# Patient Record
Sex: MALE | Race: WHITE | Employment: FULL TIME | URBAN - METROPOLITAN AREA
[De-identification: names, ages, dates, MRNs, and addresses within clinical notes are randomized per-mention and may not be internally consistent; named-entity substitution may affect disease eponyms.]

---

## 2019-08-26 ENCOUNTER — HOSPITAL ENCOUNTER (OUTPATIENT)
Dept: PHYSICAL THERAPY | Age: 59
Discharge: HOME | End: 2019-08-26
Payer: COMMERCIAL

## 2019-08-26 PROCEDURE — 97530 THERAPEUTIC ACTIVITIES: CPT

## 2019-08-26 PROCEDURE — 97140 MANUAL THERAPY 1/> REGIONS: CPT

## 2019-08-26 PROCEDURE — 97162 PT EVAL MOD COMPLEX 30 MIN: CPT

## 2019-08-26 NOTE — PROGRESS NOTES
[address] [place] [address], [address] [facility] [address] - Phone: [phone]  Fax: [phone] OF CARE / [address]  Patient Name: Deni Severino : 1960   Medical   Diagnosis: Lymphedema of left lower extremity [I89.0] Treatment Diagnosis: Lymphedema of left lower extremity [I89.0]   Onset Date:      Referral Source: Evy Simmons MD [facility]): 2019   Prior Hospitalization: See medical history Provider #: [id number]   Prior Level of Function: I with ADL's, no LE pain or swelling prior to surgery   Comorbidities: HBP, Hx of cancer   Medications: Verified on Patient Summary List   The Plan of Care and following information is based on the information from the initial evaluation.   ==================================================================================  Assessment / key information:  Deni Severino is a 59 y.o.  yo male with Dx: Lymphedema of left lower extremity [I89.0]. Pt reports having sarcoma removed in  and noting increased swelling in the L LE following cancer removal. Pt reports increase in L thigh and lower leg swelling starting in July. Pt reports recent medication changes possibly contributing to the change in edema. Pt reports currently having nodules on his lungs and chest wall, however no treatment planned at this time. Pt reports current symptoms include: swollen L LE and thigh. Pt denies pain at this time. Past medical history includes: HBP, Hx of cancer with surgery for removal. Functional limitations include: difficulty with standing, walking, sitting, recreational activities such as tennis, difficulty don/doff shoes. Previous treatment for lymphedema includes: wearing compression stockings, elevation of the LE's, previous OPPT 25 years ago. Pt reports no red flags or contraindications to treatment at this time.  FOTO score = 62 (an established functional score where 100 = no disability). Pt reports he sustained a fall last week while in the bathroom. Pt reports getting dizzy and falling. Pt denied seeking medical attention following the fall. Current Exam findings: Posture: WNL, Gait mild decrease in L knee flexion with ambulation, AROM: WNL (pt states that when swelling is at its worst difficulty with all knee and ankle motions), Gross Strength: 4+/5 L, 5/5 R, Skin Integrity: moderate dry, indurated tissue along the medial thigh with scar tissue, mild pitting of the thigh with hyperkeratosis, mild dry texture around the dorsum of the foot and ankle, Sensation: intact to light touch. Circumferential Measurements are listed below:   Right Left   MTP 23 23.5   Navicular 26 26   Ankle 23 25   Calf 38 42   Knee 42 47   Thigh  56 61       The patient was instructed in a home exercise program to address the above findings/deficits. Pt will benefit from PT interventions to address the aforementioned deficits and allow pt to return to PLOF. Pt's therapy may include: manual lymphatic drainage, compression wrapping, LE decongestive exercises, and patient education on skin care, signs and symptoms of infection, and self-management upon DC.      ==================================================================================  Eval Complexity: History MEDIUM  Complexity : 1-2 comorbidities / personal factors will impact the outcome/ POC ;  Examination  HIGH Complexity : 4+ Standardized tests and measures addressing body structure, function, activity limitation and / or participation in recreation ; Presentation MEDIUM Complexity : Evolving with changing characteristics ;   Decision Making MEDIUM Complexity : FOTO score of 26-74; Overall Complexity MEDIUM  Problem List: decrease ROM, decrease strength, edema affecting function, impaired gait/ balance, decrease ADL/ functional abilitiies, decrease activity tolerance, decrease flexibility/ joint mobility and decrease transfer abilities   Treatment Plan may include any combination of the following: Therapeutic exercise, Therapeutic activities, Neuromuscular re-education, Physical agent/modality, Gait/balance training, Manual therapy, Aquatic therapy, Patient education, Self Care training, Functional mobility training, Home safety training and Stair training  Patient / Family readiness to learn indicated by: asking questions, trying to perform skills and interest  Persons(s) to be included in education: patient (P)   Barriers to Learning/Limitations: None  Measures taken, if barriers to learning:    Patient Goal (s): Improve swelling   Patient self reported health status: good  Rehabilitation Potential: good  Goals:    Short Term Goals: To be accomplished in 6 treatments  1) Pt will be Independent with skin care routine to decrease risk of infection. 2) Pt will be Independent in don/doff/care of/wearing schedule of light compression. 3) Pt will verbalize with 100% accuracy which signs and symptoms to report immediately to physician concerning their condition. Long Term Goals: To be accomplished in 12 treatments  1) Pt will be Independent with compression management routine consisting of skin care, decongestive exercises, self-manual lymphatic stimulation techniques, strengthening and motion exercises, and don/doff/care of appropriate compression garment(s). 2) Patient will demonstrate decongestion of limb by 5% to improve functional ADL's  3) Pt will rate +5 on the GROC in order to demonstrate significant change in symptoms.      Frequency / Duration:   Patient to be seen  2  times per week for 8  treatments:  Patient / Caregiver education and instruction: exercises  G-Codes (GP): yordan  Therapist Signature: Alfonzo Roberts PT DPT   Date: 5/63/7826   Certification Period: YORDAN Time: 8:47 AM   ==================================================================================  I certify that the above Physical Therapy Services are being furnished while the patient is under my care. I agree with the treatment plan and certify that this therapy is necessary. Physician Signature:        Date:       Time:     Please sign and return to [facility], [facility]. or you may fax the signed copy to [phone]. Thank you.

## 2019-08-26 NOTE — PROGRESS NOTES
PHYSICAL THERAPY - DAILY TREATMENT NOTE    Patient Name: Chekih Pan        Date: 2019  : 1960   yes Patient  Verified  Visit #:   1     Insurance: Payor: /      In time: 900 Out time: 945   Total Treatment Time: 45     Medicare/[employer] Time Tracking (below)   Total Timed Codes (min):  25 1:1 Treatment Time:  25     TREATMENT AREA =  Lymphedema of left lower extremity [I89.0]    SUBJECTIVE  Pain Level (on 0 to 10 scale):  0  / 10   Medication Changes/New allergies or changes in medical history, any new surgeries or procedures?    no  If yes, update Summary List   Subjective Functional Status/Changes:  []  No changes reported     SEE POC         OBJECTIVE    15 min Evaluation- SEE POC     10 min Manual Therapy: L LE in supine   Rationale: decreased edema to improve the patients ability to perform functional ADL's    15 min Therapeutic Activity: Pt educated on:  s/s of lymphedema, signs of infection/red flags, importance of proper skin care, decongestive exercises. Pt given Lymphedema Packet. Rationale: To increased ROM, strength, decreased edema to improve the patients ability to perform functional ADL's    Billed With/As:   [] TE   [x] TA   [] Neuro   [] Self Care Patient Education: [x] Review HEP     [x] other: PATIENT GIVEN LYMPHEDEMA INFORMATION PACKET     Other Objective/Functional Measures:    SEE POC     Post Treatment Pain Level (on 0 to 10) scale:   0  / 10     ASSESSMENT  Assessment/Changes in Function:        Evaluation Code Complexity: History MEDIUM  Complexity : 1-2 comorbidities / personal factors will impact the outcome/ POC ; Examination HIGH Complexity : 4+ Standardized tests and measures addressing body structure, function, activity limitation and / or participation in recreation ; Presentation MEDIUM Complexity : Evolving with changing characteristics ; Decision Making MEDIUM Complexity : FOTO score of 26-74;  Complexity MEDIUM    Justification for Eval Code Complexity:  Patient History : Hx of chondrosarcoma, HBP  Examination SEE ABOVE EXAM  Clinical Presentation: evolving swelling  Clinical Decision Making : YJEB [id number]         []  See Progress Note/Recertification   Patient will continue to benefit from skilled PT services to modify and progress therapeutic interventions, address functional mobility deficits, address ROM deficits, address strength deficits, analyze and address soft tissue restrictions and address lymphedema to attain remaining goals. Progress toward goals / Updated goals:    SEE POC     PLAN  []  Upgrade activities as tolerated yes Continue plan of care   []  Discharge due to :    [x]  Other: Pt will be seen 2 times per week for 8-12 sessions.       Therapist: Thurl Dancer, PT, DPT    Date: 8/26/2019 Time: 8:48 AM     Future Appointments   Date Time Provider Lacey Toussaint   8/26/2019  9:00 AM You Yip, PT [facility]

## 2019-09-17 ENCOUNTER — HOSPITAL ENCOUNTER (OUTPATIENT)
Dept: PHYSICAL THERAPY | Age: 59
Discharge: HOME | End: 2019-09-17
Payer: COMMERCIAL

## 2019-09-17 PROCEDURE — 97140 MANUAL THERAPY 1/> REGIONS: CPT

## 2019-09-17 NOTE — PROGRESS NOTES
PHYSICAL THERAPY - DAILY TREATMENT NOTE    Patient Name: Rusty Vigil        Date: 2019  : 1960   yes Patient  Verified  Visit #:   2     Insurance: Payor: [employer] / Plan: [address] [place] [address] / Product Type: PPO /      In time: 315 Out time: 400   Total Treatment Time: 45     Medicare/[employer] Time Tracking (below)   Total Timed Codes (min):  45 1:1 Treatment Time:  45     TREATMENT AREA =  Lymphedema of left lower extremity [I89.0]    SUBJECTIVE  Pain Level (on 0 to 10 scale):  0  / 10   Medication Changes/New allergies or changes in medical history, any new surgeries or procedures?    no  If yes, update Summary List   Subjective Functional Status/Changes:  []  No changes reported     Pt reports not wearing compression or performing self MLD since his IE.          OBJECTIVE    45 min Manual Therapy: MLD: L LE in long sitting   Rationale:      decrease edema  to improve patient's ability to perform functional ADL's      Billed With/As:   [] TE   [] TA   [x] Manual   [] Self Care Patient Education: [x] Review HEP    [] Progressed/Changed HEP based on:   [] positioning   [] body mechanics   [] transfers   [] heat/ice application    [] other: Discussed with and educated patient on importance of decongestive exercises during manual therapy     Other Objective/Functional Measures:    Skin and Tissue Status: Moderate fibrotic indurated tissue of the L thigh and ankle    Measurements: ND    Compression Wrapping: ND           Post Treatment Pain Level (on 0 to 10) scale:   0  / 10     ASSESSMENT  Assessment/Changes in Function:     Tolerance to treatment: No adverse reaction to treatment    Patient Education: Educated on importance of compression and self MLD       []  See Progress Note/Recertification   Patient will continue to benefit from skilled PT services to modify and progress therapeutic interventions, address functional mobility deficits, address ROM deficits, address strength deficits, analyze and address soft tissue restrictions and address Lymphedema to attain remaining goals.    Progress toward goals / Updated goals:    Progress towards STG's: first visit  Progress towards LTG's: first visit     PLAN  []  Upgrade activities as tolerated yes Continue plan of care   []  Discharge due to :    []  Other:      Therapist: Krzysztof Piper PT, DPT    Date: 9/17/2019 Time: 7:43 AM     Future Appointments   Date Time Provider Lacey Toussaint   9/17/2019  3:15 PM Varun Yip [facility]   9/23/2019 10:00 AM Varun Yip [facility]   9/25/2019 10:00 AM Varun Yip [facility]   9/30/2019 10:00 AM Varun Yip [facility]   10/3/2019  9:30 AM Varun Yip [facility]   10/7/2019 10:00 AM Varun Yip [facility]   10/10/2019  9:30 AM Varun Yip [facility]   10/15/2019  9:30 AM Varun Yip [facility]   10/17/2019  9:30 AM Velma Yip PT [facility]

## 2019-09-23 ENCOUNTER — HOSPITAL ENCOUNTER (OUTPATIENT)
Dept: PHYSICAL THERAPY | Age: 59
Discharge: HOME | End: 2019-09-23
Payer: COMMERCIAL

## 2019-09-23 PROCEDURE — 97140 MANUAL THERAPY 1/> REGIONS: CPT

## 2019-09-23 NOTE — PROGRESS NOTES
PHYSICAL THERAPY - DAILY TREATMENT NOTE    Patient Name: Amanda Espinoza        Date: 2019  : 1960   yes Patient  Verified  Visit #:   3     Insurance: Payor: [facility] / Plan: [address] [place] [address] / Product Type: PPO /      In time: 1000 Out time: 1045   Total Treatment Time: 45     Medicare/[facility] Time Tracking (below)   Total Timed Codes (min):  45 1:1 Treatment Time:  45     TREATMENT AREA =  Lymphedema of left lower extremity [I89.0]    SUBJECTIVE  Pain Level (on 0 to 10 scale): 0  / 10   Medication Changes/New allergies or changes in medical history, any new surgeries or procedures?    no  If yes, update Summary List   Subjective Functional Status/Changes:  []  No changes reported     Pt reports playing tennis 2x/week and noting no increase in swelling. OBJECTIVE    45 min Manual Therapy: MLD: L LE in long sitting   Rationale:      decrease edema  to improve patient's ability to perform functional ADL's      Billed With/As:   [] TE   [] TA   [x] Manual   [] Self Care Patient Education: [x] Review HEP    [] Progressed/Changed HEP based on:   [] positioning   [] body mechanics   [] transfers   [] heat/ice application    [] other: Discussed with and educated patient on importance of decongestive exercises during manual therapy     Other Objective/Functional Measures:    Skin and Tissue Status: Moderate dry texture on the inner thigh. Measurements: ND    Compression Wrapping: ND           Post Treatment Pain Level (on 0 to 10) scale:   0  / 10     ASSESSMENT  Assessment/Changes in Function:     Tolerance to treatment: No adverse reaction to treatment    Patient Education: educated on pump and self massage. Pt declined any further pump discussion at this time.         []  See Progress Note/Recertification   Patient will continue to benefit from skilled PT services to modify and progress therapeutic interventions, address functional mobility deficits, address ROM deficits, address strength deficits, analyze and address soft tissue restrictions and address Lymphedema to attain remaining goals.    Progress toward goals / Updated goals:         PLAN  []  Upgrade activities as tolerated yes Continue plan of care   []  Discharge due to :    []  Other:      Therapist: Edna Winter, PT, DPT    Date: 9/23/2019 Time: 7:43 AM     Future Appointments   Date Time Provider Lacey Toussaint   9/23/2019 10:00 AM Houchins, Randal Kehr [facility]   9/25/2019 10:00 AM [facility]   9/30/2019 10:00 AM [facility]   10/3/2019  9:30 AM Theodora Randal Kehr [facility]   10/7/2019 10:00 [facility]   10/10/2019  9:30 [facility]   10/15/2019  9:30 [facility]   10/17/2019  9:30 AM Kelton Yip PT [facility]

## 2019-09-25 ENCOUNTER — HOSPITAL ENCOUNTER (OUTPATIENT)
Dept: PHYSICAL THERAPY | Age: 59
Discharge: HOME | End: 2019-09-25
Payer: COMMERCIAL

## 2019-09-25 PROCEDURE — 97140 MANUAL THERAPY 1/> REGIONS: CPT

## 2019-09-25 NOTE — PROGRESS NOTES
[facility] PHYSICAL THERAPY  [address] [place] [address] 201,[facility], [address] [facility] - Phone: [phone]  Fax: [phone]  PROGRESS NOTE  Patient Name: Dalton Mcgee : 1960   Treatment/Medical Diagnosis: Lymphedema of left lower extremity [I89.0]   Referral Source: Debbie Cruz MD     Date of Initial Visit: 19 Attended Visits: 4 Missed Visits: CX 0  NS 0     SUMMARY OF TREATMENT  Pt was seen on 19 for an initial evaluation for L LE Lymphedema. Pt has been seen for 4 visits and therapy has included: manual lymphatic drainage, and patient education on skin care, s/s infection, decongestive exercises, and HEP. CURRENT STATUS  Pt has been seen for 4 visits since IE on 19. Pt's therapy has included: manual lymphatic drainage, as well as patient education on self-management including: decongestive exercises, self-massage, positioning including elevation, appropriate wear of compression, as well as proper skin care and signs/symptoms of infection. Measurements are listed above. Pt reports max pain 0/10 at this time, with average pain 0/10. Pt reports good compliance with HEP at this time which includes decongestive exercises, self-massage, and positioning/elevation. Pt has been educated on self MLD as well as compression garments which may assist in the L thigh reduction. Pt would continue to benefit from skilled PT 2x/week for 6-8 sessions. Goals:    Short Term Goals: To be accomplished in 6 treatments  1) Pt will be Independent with skin care routine to decrease risk of infection.- goal met  2) Pt will be Independent in don/doff/care of/wearing schedule of light compression.- goal progressing  3) Pt will verbalize with 100% accuracy which signs and symptoms to report immediately to physician concerning their condition.- goal met        Long Term Goals:  To be accomplished in 12 treatments  1) Pt will be Independent with compression management routine consisting of skin care, decongestive exercises, self-manual lymphatic stimulation techniques, strengthening and motion exercises, and don/doff/care of appropriate compression garment(s). - goal progressing  2) Patient will demonstrate decongestion of limb by 5% to improve functional ADL's- goal progressing  3) Pt will rate +5 on the GROC in order to demonstrate significant change in symptoms. - will assess at DC        MEASUREMENTS:   Right 8/26/19 Left 8/26/19 Left 9/25/19   MTP 23 23.5 23   Navicular 26 26 25   Ankle 23 25 24   Calf 38 42 41   Knee 42 47 46   Thigh  56 61 60           New Goals to be achieved in __6-8__  treatments:  1. Continue with the above unmet goals      RECOMMENDATIONS  Continue skilled PT 2x/week for 6-8 session to progress patient to discharge and self management of symptoms through decongestive exercises, self massage, and compression wearing. If you have any questions/comments please contact us directly at [phone]. Thank you for allowing us to assist in the care of your patient. Therapist Signature: Krzysztof Piper PT, DPT Date: 9/25/2019   Reporting Period: NA Time: 7:16 AM   NOTE TO PHYSICIAN:  PLEASE COMPLETE THE ORDERS BELOW AND FAX TO   [facility]: [phone]  If you are unable to process this request in 24 hours please contact our office: [phone]    ___ I have read the above report and request that my patient continue as recommended.   ___ I have read the above report and request that my patient continue therapy with the following changes/special instructions:_________________________________________________________   ___ I have read the above report and request that my patient be discharged from therapy.      Physician Signature:        Date:       Time:

## 2019-09-25 NOTE — PROGRESS NOTES
PHYSICAL THERAPY - DAILY TREATMENT NOTE    Patient Name: Deni Severino        Date: 2019  : 1960   yes Patient  Verified  Visit #:   4     Insurance: Payor: Jamey Clark / Plan: [address] [place] [address] / Product Type: PPO /      In time: 1000 Out time: 1045   Total Treatment Time: 45     Medicare/[facility] Time Tracking (below)   Total Timed Codes (min):  45 1:1 Treatment Time:  45     TREATMENT AREA =  Lymphedema of left lower extremity [I89.0]    SUBJECTIVE  Pain Level (on 0 to 10 scale): 0  / 10   Medication Changes/New allergies or changes in medical history, any new surgeries or procedures?    no  If yes, update Summary List   Subjective Functional Status/Changes:  []  No changes reported     SEE PN         OBJECTIVE    45 min Manual Therapy: MLD: L LE in long sitting   Rationale:      decrease edema  to improve patient's ability to perform functional ADL's      Billed With/As:   [] TE   [] TA   [x] Manual   [] Self Care Patient Education: [x] Review HEP    [] Progressed/Changed HEP based on:   [] positioning   [] body mechanics   [] transfers   [] heat/ice application    [] other: Discussed with and educated patient on importance of decongestive exercises during manual therapy     Other Objective/Functional Measures:    SEE PN       Post Treatment Pain Level (on 0 to 10) scale:   0  / 10     ASSESSMENT  Assessment/Changes in Function:     SEE PN     []  See Progress Note/Recertification   Patient will continue to benefit from skilled PT services to modify and progress therapeutic interventions, address functional mobility deficits, address ROM deficits, address strength deficits, analyze and address soft tissue restrictions and address Lymphedema to attain remaining goals.    Progress toward goals / Updated goals:    SEE PN     PLAN  []  Upgrade activities as tolerated yes Continue plan of care   []  Discharge due to :    []  Other:      Therapist: Qing Patel, PT, DPT    Date: 9/25/2019 Time: 7:43 AM     Future Appointments   Date Time Provider Lacey Toussaint   9/25/2019 10:00 AM Tonia Yip [place] [id number]   9/30/2019 10:00 AM Tonia Yip [place] [address]   10/3/2019  9:30 AM Tonia Yip [place] [address]   10/7/2019 10:00 AM Tonia Yip [place] [address]   10/10/2019  9:30 AM Tonia Yip [place] [address]   10/15/2019  9:30 AM Tonia Yip [place] [address]   10/17/2019  9:30 AM Sahil Yip, PT [place] [address]

## 2019-09-30 ENCOUNTER — HOSPITAL ENCOUNTER (OUTPATIENT)
Dept: PHYSICAL THERAPY | Age: 59
Discharge: HOME | End: 2019-09-30
Payer: COMMERCIAL

## 2019-09-30 PROCEDURE — 97140 MANUAL THERAPY 1/> REGIONS: CPT

## 2019-09-30 NOTE — PROGRESS NOTES
PHYSICAL THERAPY - DAILY TREATMENT NOTE    Patient Name: Hayden Michael        Date: 2019  : 1960   yes Patient  Verified  Visit #:   5     Insurance: Payor: Darian Aguilar / Plan: [address] [place] [address] / Product Type: PPO /      In time: 1000 Out time: 1117   Total Treatment Time: 45     Medicare/[facility] Time Tracking (below)   Total Timed Codes (min):  45 1:1 Treatment Time:  45     TREATMENT AREA =  Lymphedema of left lower extremity [I89.0]    SUBJECTIVE  Pain Level (on 0 to 10 scale): 0  / 10   Medication Changes/New allergies or changes in medical history, any new surgeries or procedures?    no  If yes, update Summary List   Subjective Functional Status/Changes:  []  No changes reported     Pt reported moderare decreaes in swelling with new compression sock over the weekend. OBJECTIVE    45 min Manual Therapy: MLD: L LE in long sitting   Rationale:      decrease edema  to improve patient's ability to perform functional ADL's      Billed With/As:   [] TE   [] TA   [x] Manual   [] Self Care Patient Education: [x] Review HEP    [] Progressed/Changed HEP based on:   [] positioning   [] body mechanics   [] transfers   [] heat/ice application    [] other: Discussed with and educated patient on importance of decongestive exercises during manual therapy     Other Objective/Functional Measures:    No noted pitting along the anterior foot or ankle, increased fibrotic tissue along the medial thigh       Post Treatment Pain Level (on 0 to 10) scale:   0  / 10     ASSESSMENT  Assessment/Changes in Function:     Educated on thigh scar massage as well as assisted pt in ordering compression sock.       []  See Progress Note/Recertification   Patient will continue to benefit from skilled PT services to modify and progress therapeutic interventions, address functional mobility deficits, address ROM deficits, address strength deficits, analyze and address soft tissue restrictions and address Lymphedema to attain remaining goals.    Progress toward goals / Updated goals:    PN Last visit     PLAN  []  Upgrade activities as tolerated yes Continue plan of care   []  Discharge due to :    []  Other:      Therapist: Amaya Perdomo PT, DPT    Date: 9/30/2019 Time: 7:43 AM     Future Appointments   Date Time Provider Lacey Toussaint   9/30/2019 10:00 AM Nohemy Yip [facility]   10/3/2019  9:30 AM Nohemy Yip [facility]   10/7/2019 10:00 AM Nohemy Yip [facility]   10/10/2019  9:30 AM Nohemy Yip [facility]   10/15/2019  9:30 AM Nohemy Yip [facility]   10/17/2019  9:30 AM Mohini Yip PT [facility]

## 2019-10-03 ENCOUNTER — HOSPITAL ENCOUNTER (OUTPATIENT)
Dept: PHYSICAL THERAPY | Age: 59
Discharge: HOME | End: 2019-10-03
Payer: COMMERCIAL

## 2019-10-03 PROCEDURE — 97140 MANUAL THERAPY 1/> REGIONS: CPT

## 2019-10-03 NOTE — PROGRESS NOTES
PHYSICAL THERAPY - DAILY TREATMENT NOTE    Patient Name: Ina Homans        Date: 10/3/2019  : 1960   yes Patient  Verified  Visit #:   6     Insurance: Payor: Arnaldo Barnhart / Plan: [address] [place] [address] / Product Type: PPO /      In time: 930 Out time: 1010   Total Treatment Time: 40     Medicare/[employer] Time Tracking (below)   Total Timed Codes (min):  40 1:1 Treatment Time:  40     TREATMENT AREA =  Lymphedema of left lower extremity [I89.0]    SUBJECTIVE  Pain Level (on 0 to 10 scale): 0  / 10   Medication Changes/New allergies or changes in medical history, any new surgeries or procedures?    no  If yes, update Summary List   Subjective Functional Status/Changes:  []  No changes reported     Pt reports being able to see his foot bones for the first time in 4 weeks. OBJECTIVE    40 min Manual Therapy: MLD: L LE in long sitting   Rationale:      decrease edema  to improve patient's ability to perform functional ADL's      Billed With/As:   [] TE   [] TA   [x] Manual   [] Self Care Patient Education: [x] Review HEP    [] Progressed/Changed HEP based on:   [] positioning   [] body mechanics   [] transfers   [] heat/ice application    [] other: Discussed with and educated patient on importance of decongestive exercises during manual therapy     Other Objective/Functional Measures:    No noted swelling in the dorsum of the foot today compared to last visit. Post Treatment Pain Level (on 0 to 10) scale:   0  / 10     ASSESSMENT  Assessment/Changes in Function:     Continues to progress well.      []  See Progress Note/Recertification   Patient will continue to benefit from skilled PT services to modify and progress therapeutic interventions, address functional mobility deficits, address ROM deficits, address strength deficits, analyze and address soft tissue restrictions and address Lymphedema to attain remaining goals.    Progress toward goals / Updated goals:         PLAN  [] Upgrade activities as tolerated yes Continue plan of care   []  Discharge due to :    []  Other:      Therapist: Dontrell Brink, PT, DPT    Date: 10/3/2019 Time: 7:43 AM     Future Appointments   Date Time Provider Lacey Breana   10/3/2019  9:30 AM Flor Yip [facility]   10/7/2019 10:00 AM Flor Yip [facility]   10/10/2019  9:30 AM Flor Yip [facility]   10/15/2019  9:30 AM Flor Yip [facility]   10/17/2019  9:30 AM Flor Yip [facility]   11/4/2019 10:00 AM Flor Yip [facility]   11/6/2019 10:00 AM Cynthia Yip, KEYA [facility]

## 2019-10-07 ENCOUNTER — APPOINTMENT (OUTPATIENT)
Dept: PHYSICAL THERAPY | Age: 59
End: 2019-10-07
Payer: COMMERCIAL

## 2019-10-10 ENCOUNTER — HOSPITAL ENCOUNTER (OUTPATIENT)
Dept: PHYSICAL THERAPY | Age: 59
Discharge: HOME | End: 2019-10-10
Payer: COMMERCIAL

## 2019-10-10 PROCEDURE — 97140 MANUAL THERAPY 1/> REGIONS: CPT

## 2019-10-10 NOTE — PROGRESS NOTES
PHYSICAL THERAPY - DAILY TREATMENT NOTE    Patient Name: Pastor Calero        Date: 10/10/2019  : 1960   yes Patient  Verified  Visit #:     Insurance: Payor: Josefa Mcrae / Plan: [address] [place] [address] / Product Type: PPO /      In time: 930 Out time: 1010   Total Treatment Time: 40     Medicare/[facility] Time Tracking (below)   Total Timed Codes (min):  40 1:1 Treatment Time:  40     TREATMENT AREA =  Lymphedema of left lower extremity [I89.0]    SUBJECTIVE  Pain Level (on 0 to 10 scale): 0  / 10   Medication Changes/New allergies or changes in medical history, any new surgeries or procedures?    no  If yes, update Summary List   Subjective Functional Status/Changes:  []  No changes reported     Pt reports ordering 2 pairs of socks and wearing then daily. .         OBJECTIVE    40 min Manual Therapy: MLD: L LE in long sitting   Rationale:      decrease edema  to improve patient's ability to perform functional ADL's      Billed With/As:   [] TE   [] TA   [x] Manual   [] Self Care Patient Education: [x] Review HEP    [] Progressed/Changed HEP based on:   [] positioning   [] body mechanics   [] transfers   [] heat/ice application    [] other: Discussed with and educated patient on importance of decongestive exercises during manual therapy     Other Objective/Functional Measures: Moderate decrease in thigh edema and firbrotic tissue today following manual       Post Treatment Pain Level (on 0 to 10) scale:   0  / 10     ASSESSMENT  Assessment/Changes in Function:     Continue to wear stocking. Will be seen 2-3 more weeks then pt is going on a cruise. []  See Progress Note/Recertification   Patient will continue to benefit from skilled PT services to modify and progress therapeutic interventions, address functional mobility deficits, address ROM deficits, address strength deficits, analyze and address soft tissue restrictions and address Lymphedema to attain remaining goals.    Progress toward goals / Updated goals:         PLAN  []  Upgrade activities as tolerated yes Continue plan of care   []  Discharge due to :    []  Other:      Therapist: Hira Moody, PT, DPT    Date: 10/10/2019 Time: 7:43 AM     Future Appointments   Date Time Provider Lacey Toussaint   10/10/2019  9:30 AM [facility]   10/15/2019  9:30 AM [facility]ora [facility]   10/17/2019  9:30 AM Theodora [facility]   11/4/2019 10:00 AM Theodora [facility]   11/6/2019 10:00 AM Tia Yip, KEYA [facility]

## 2019-10-15 ENCOUNTER — HOSPITAL ENCOUNTER (OUTPATIENT)
Dept: PHYSICAL THERAPY | Age: 59
Discharge: HOME | End: 2019-10-15
Payer: COMMERCIAL

## 2019-10-15 PROCEDURE — 97140 MANUAL THERAPY 1/> REGIONS: CPT

## 2019-10-15 NOTE — PROGRESS NOTES
PHYSICAL THERAPY - DAILY TREATMENT NOTE    Patient Name: Sam [place]        Date: 10/15/2019  : 1960   yes Patient  Verified  Visit #:   8     Insurance: Payor: Mary Jo Vazquez / Plan: [address] [place] [address] / Product Type: PPO /      In time: 930 Out time: 4143   Total Treatment Time: 45     Medicare/[facility] Time Tracking (below)   Total Timed Codes (min):  45 1:1 Treatment Time:  45     TREATMENT AREA =  Lymphedema of left lower extremity [I89.0]    SUBJECTIVE  Pain Level (on 0 to 10 scale): 0  / 10   Medication Changes/New allergies or changes in medical history, any new surgeries or procedures?    no  If yes, update Summary List   Subjective Functional Status/Changes:  []  No changes reported     Pt reports playing tennis this weekend with his sleeve on and noting no increase in swelling         OBJECTIVE    45 min Manual Therapy: MLD: L LE in long sitting   Rationale:      decrease edema  to improve patient's ability to perform functional ADL's      Billed With/As:   [] TE   [] TA   [x] Manual   [] Self Care Patient Education: [x] Review HEP    [] Progressed/Changed HEP based on:   [] positioning   [] body mechanics   [] transfers   [] heat/ice application    [] other: Discussed with and educated patient on importance of decongestive exercises during manual therapy     Other Objective/Functional Measures:    Continues to have fullness in the L inner thigh with mild fibrotic scar tissue        Post Treatment Pain Level (on 0 to 10) scale:   0  / 10     ASSESSMENT  Assessment/Changes in Function:     Good tolerance to all MLD and good compliance with Home exercises     []  See Progress Note/Recertification   Patient will continue to benefit from skilled PT services to modify and progress therapeutic interventions, address functional mobility deficits, address ROM deficits, address strength deficits, analyze and address soft tissue restrictions and address Lymphedema to attain remaining goals. Progress toward goals / Updated goals:         PLAN  []  Upgrade activities as tolerated yes Continue plan of care   []  Discharge due to :    []  Other:      Therapist: Angelina Redman, PT, DPT    Date: 10/15/2019 Time: 7:43 AM     Future Appointments   Date Time Provider Lacey Toussaint   10/15/2019  9:30 AM Silvio Yip [facility]   10/17/2019  9:30 AM Silvio Yip [facility]   11/4/2019 10:00 AM Silvio Yip [facility]   11/6/2019 10:00 AM Jerome Yip PT [facility]

## 2019-10-17 ENCOUNTER — HOSPITAL ENCOUNTER (OUTPATIENT)
Dept: PHYSICAL THERAPY | Age: 59
Discharge: HOME | End: 2019-10-17
Payer: COMMERCIAL

## 2019-10-17 PROCEDURE — 97140 MANUAL THERAPY 1/> REGIONS: CPT

## 2019-10-17 NOTE — PROGRESS NOTES
PHYSICAL THERAPY - DAILY TREATMENT NOTE    Patient Name: Lionel Lopez        Date: 10/17/2019  : 1960   yes Patient  Verified  Visit #:     Insurance: Payor: Hailee Bronson / Plan: [address] [place] [address] / Product Type: PPO /      In time: 930 Out time: 1000   Total Treatment Time: 30     Medicare/[facility] Time Tracking (below)   Total Timed Codes (min):  30 1:1 Treatment Time: 30     TREATMENT AREA =  Lymphedema of left lower extremity [I89.0]    SUBJECTIVE  Pain Level (on 0 to 10 scale): 0  / 10   Medication Changes/New allergies or changes in medical history, any new surgeries or procedures?    no  If yes, update Summary List   Subjective Functional Status/Changes:  []  No changes reported     Pt reports wearing his sleeve to play tennis this week. OBJECTIVE    30 min Manual Therapy: MLD: L LE in long sitting   Rationale:      decrease edema  to improve patient's ability to perform functional ADL's      Billed With/As:   [] TE   [] TA   [x] Manual   [] Self Care Patient Education: [x] Review HEP    [] Progressed/Changed HEP based on:   [] positioning   [] body mechanics   [] transfers   [] heat/ice application    [] other: Discussed with and educated patient on importance of decongestive exercises during manual therapy     Other Objective/Functional Measures:    Mild fibrotic tissue in the inner L thigh       Post Treatment Pain Level (on 0 to 10) scale:   0  / 10     ASSESSMENT  Assessment/Changes in Function:     Will be out of town for 2 weeks. []  See Progress Note/Recertification   Patient will continue to benefit from skilled PT services to modify and progress therapeutic interventions, address functional mobility deficits, address ROM deficits, address strength deficits, analyze and address soft tissue restrictions and address Lymphedema to attain remaining goals.    Progress toward goals / Updated goals:    Pt will be OOT for 2 weeks then will return for 2 remaining visits following return from trip     PLAN  []  Upgrade activities as tolerated yes Continue plan of care   []  Discharge due to :    []  Other:      Therapist: Jaclyn Longoria, PT, DPT    Date: 10/17/2019 Time: 7:43 AM     Future Appointments   Date Time Provider Lacey Toussaint   10/17/2019  9:30 AM Mercy Yip [facility]   11/4/2019 10:00 AM Mercy Yip [facility]   11/6/2019 10:00 AM Jostin Yip, KEYA [facility]

## 2019-11-04 ENCOUNTER — HOSPITAL ENCOUNTER (OUTPATIENT)
Dept: PHYSICAL THERAPY | Age: 59
Discharge: HOME | End: 2019-11-04
Payer: COMMERCIAL

## 2019-11-04 PROCEDURE — 97140 MANUAL THERAPY 1/> REGIONS: CPT

## 2019-11-04 NOTE — PROGRESS NOTES
PHYSICAL THERAPY - DAILY TREATMENT NOTE    Patient Name: Leticia Mcghee        Date: 2019  : 1960   yes Patient  Verified  Visit #:  10     Insurance: Payor: Karen Harvey / Plan: [address] [place] [address] / Product Type: PPO /      In time: 1000 Out time: 1045   Total Treatment Time: 45     Medicare/[employer] Time Tracking (below)   Total Timed Codes (min):  45 1:1 Treatment Time: 45     TREATMENT AREA =  Lymphedema of left lower extremity [I89.0]    SUBJECTIVE  Pain Level (on 0 to 10 scale): 0  / 10   Medication Changes/New allergies or changes in medical history, any new surgeries or procedures?    no  If yes, update Summary List   Subjective Functional Status/Changes:  []  No changes reported     Pt reports returning from his cruise and stated he was only able to wear his stocking intermittently          OBJECTIVE    45 min Manual Therapy: MLD: L LIZBETH in long sitting   Rationale:      decrease edema  to improve patient's ability to perform functional ADL's      Billed With/As:   [] TE   [] TA   [x] Manual   [] Self Care Patient Education: [x] Review HEP    [] Progressed/Changed HEP based on:   [] positioning   [] body mechanics   [] transfers   [] heat/ice application    [] other: Discussed with and educated patient on importance of decongestive exercises during manual therapy     Other Objective/Functional Measures:    Mild increase in hardened tissue on the inner thigh as well as dorsum of the foot       Post Treatment Pain Level (on 0 to 10) scale:   0  / 10     ASSESSMENT  Assessment/Changes in Function:     Will be DC NV     []  See Progress Note/Recertification   Patient will continue to benefit from skilled PT services to modify and progress therapeutic interventions, address functional mobility deficits, address ROM deficits, address strength deficits, analyze and address soft tissue restrictions and address Lymphedema to attain remaining goals.    Progress toward goals / Updated goals:    DC NV     PLAN  []  Upgrade activities as tolerated yes Continue plan of care   []  Discharge due to :    []  Other:      Therapist: Cayetano Pastrana PT, DPT    Date: 11/4/2019 Time: 7:43 AM     Future Appointments   Date Time Provider Lacey Toussaint   11/4/2019 10:00 AM Varinder Yip, KEYA [facility]   11/6/2019 10:00 AM Varinder Yip, KEYA [facility]

## 2019-11-06 ENCOUNTER — HOSPITAL ENCOUNTER (OUTPATIENT)
Dept: PHYSICAL THERAPY | Age: 59
Discharge: HOME | End: 2019-11-06
Payer: COMMERCIAL

## 2019-11-06 PROCEDURE — 97140 MANUAL THERAPY 1/> REGIONS: CPT

## 2019-11-06 PROCEDURE — 97530 THERAPEUTIC ACTIVITIES: CPT

## 2019-11-06 NOTE — PROGRESS NOTES
[facility] PHYSICAL THERAPY  [address] [place] [address] 51, [facility] 201,[facility], 70 [facility] - Phone: [phone]  Fax: [phone] FOR PHYSICAL THERAPY          Patient Name: Gabriel Sanderson : 1960   Treatment/Medical Diagnosis: Lymphedema of left lower extremity [I89.0]   Onset Date:     Referral Source: Majo Teixeira MD [facility]): 19   Prior Hospitalization: See Medical History Provider #: [id number]   Prior Level of Function: I with ADL's, no LE pain or swelling prior to surgery   Comorbidities: HBP, Hx of cancer   Medications: Verified on Patient Summary List   Visits from [facility]: 11 Missed Visits: 0     Goals:  Goals:    Short Term Goals: To be accomplished in 6 treatments  1) Pt will be Independent with skin care routine to decrease risk of infection.- goal met  2) Pt will be Independent in don/doff/care of/wearing schedule of light compression.- goal met  3) Pt will verbalize with 100% accuracy which signs and symptoms to report immediately to physician concerning their condition.- goal met        Long Term Goals: To be accomplished in 12 treatments  1) Pt will be Independent with compression management routine consisting of skin care, decongestive exercises, self-manual lymphatic stimulation techniques, strengthening and motion exercises, and don/doff/care of appropriate compression garment(s). - goal met  2) Patient will demonstrate decongestion of limb by 5% to improve functional ADL's- goal progressed, see below  3) Pt will rate +5 on the GROC in order to demonstrate significant change in symptoms.- goal not met +4 reported        MEASUREMENTS:    Right 19 Left   19 Left 19 Left 19   MTP 23 23.5 23 23   Navicular 26 26 25 25   Ankle 23 25 24 24   Calf 38 42 41 40   Knee 42 47 46 45   Thigh  56 61 60 60          Key Functional Changes/Progress:  Pt has been seen for 11 visit since IE on 19.  Pt's therapy has included: manual lymphatic drainage,  as well as patient education on self-management including: decongestive exercises, self-massage, positioning including elevation, appropriate wear of compression, as well as proper skin care and signs/symptoms of infection. Measurements are listed above. Pt reports max pain 4/10 at this time, with average pain 1/10. Pt reports intermittent compliance with HEP at this time which includes decongestive exercises, self-massage, and positioning/elevation. Pt will be DC from therapy at this time with instructions on self management of symptoms. Pt is able to play tennis with use of compression sock as well as perform self massage daily. Assessments/Recommendations: Discontinue therapy. Progressing towards or have reached established goals. If you have any questions/comments please contact us directly at [phone]. Thank you for allowing us to assist in the care of your patient.     Therapist Signature: Hira Moody PT, DPT   Date: 11/6/2019   Reporting Period: NA Time: 9:16 AM

## 2019-11-06 NOTE — PROGRESS NOTES
PHYSICAL THERAPY - DAILY TREATMENT NOTE    Patient Name: Colonel Kay        Date: 2019  : 1960   yes Patient  Verified  Visit #:     Insurance: Payor: Magan Garzon / Plan: [address] [place] [address] / Product Type: PPO /      In time: 1000 Out time: 1045   Total Treatment Time: 45     Medicare/[facility] Time Tracking (below)   Total Timed Codes (min):  45 1:1 Treatment Time:  45     TREATMENT AREA =  Lymphedema of left lower extremity [I89.0]    SUBJECTIVE  Pain Level (on 0 to 10 scale):  0  / 10   Medication Changes/New allergies or changes in medical history, any new surgeries or procedures?    no  If yes, update Summary List   Subjective Functional Status/Changes:  []  No changes reported     SEE DC         OBJECTIVE    30 min Manual Therapy: MLD to the L LE in long sitting   Rationale:      decrease edema  to improve patient's ability to perform functional ADL's    15 min Therapeutic Activity: Maintanence Program instructions given to patient. Extensive patient education on continuation of decongestive exercises, self massage, compression, and elevation at home with handouts given and discussed. Rationale:    increase patient education in order to improve pt's ability to perform self management of symptoms upon DC from therapy.      Billed With/As:   [] TE   [x] TA   [x] Manual   [] Self Care Patient Education: [x] Review HEP    [] Progressed/Changed HEP based on:   [] positioning   [] body mechanics   [] transfers   [] heat/ice application    [] other: Discussed maintence program and patient given handout with DC instructions for self management     Other Objective/Functional Measures:      SEE DC     Post Treatment Pain Level (on 0 to 10) scale:   0  / 10     ASSESSMENT  Assessment/Changes in Function:     SEE DC       []  See Progress Note/Recertification      Progress toward goals / Updated goals:    SEE DC     PLAN  []  Upgrade activities as tolerated no Continue plan of care [x]  Discharge due to : Progressing towards self management of symptoms   []  Other:      Therapist: Joe Sullivan, PT, DPT    Date: 11/6/2019 Time: 9:16 AM     Future Appointments   Date Time Provider Lacey Toussaint   11/6/2019 10:00 AM Sienna Yip, PT [id number] [facility]

## 2020-05-28 ENCOUNTER — HOSPITAL ENCOUNTER (OUTPATIENT)
Dept: PHYSICAL THERAPY | Age: 60
Discharge: HOME | End: 2020-05-28
Payer: COMMERCIAL

## 2020-05-28 PROCEDURE — 97140 MANUAL THERAPY 1/> REGIONS: CPT

## 2020-05-28 PROCEDURE — 97162 PT EVAL MOD COMPLEX 30 MIN: CPT

## 2020-05-28 NOTE — PROGRESS NOTES
[address] [place] [address], 70 [facility] - Phone: [phone]  Fax: [phone] OF CARE / [facility]  Patient Name: Vincent Drake : 1960   Medical   Diagnosis: Lymphedema of left lower extremity [I89.0] Treatment Diagnosis: Lymphedema of left lower extremity [I89.0]   Onset Date:      Referral Source: Swati Araujo MD [facility]): 2020   Prior Hospitalization: See medical history Provider #: [id number]   Prior Level of Function: I with ADL's, no LE pain or swelling prior to surgery   Comorbidities: HBP, Hx of cancer   Medications: Verified on Patient Summary List   The Plan of Care and following information is based on the information from the initial evaluation.   ==================================================================================  Assessment / key information:  Vincent Drake is a 62 y.o.  yo male with Dx: Lymphedema of left lower extremity [I89.0]. Pt was previously seen in PT from 19-19. Pt reports since his  Last visit, he has intermittently been wearing his thigh high compression as well as continuing LE exercises. Pt reports seeing an increase in swelling the beginning of the year when he became less active secondary to facility closures. Pt reports pain in the L inner thigh with longer periods of sitting as well as riding in the car. Pt reports intermittent N/T in the L LE described as \"feeling like its asleep\". Pt reports pain ranges 0-7/10 located in the L upper thigh. Functional limitations include: difficulty with don/doff compression, difficulty with longer periods of sitting and standing, difficulty with stair negotiation. Previous treatment for lymphedema includes: CDT. Pt reports no red flags or contraindications to treatment at this time.  FOTO score = 73 (an established functional score where 100 = no disability)  Current Exam findings: Gait WNL, AROM: WNL with decreased hip and knee mobility due to soft tissue restriction, Gross Strength: grossly 4/5 BL LE's, Skin Integrity: moderate fibrotic tissue on the medial thigh with rough patchy dry skin, mild pitting along the medial thigh, Sensation: intact to light touch. Circumferential Measurements are listed below:   Left Right   MTP 23 23   Navicular 25 25   Ankle 24 24   Calf 44 40   Knee 48 42   Thigh  62 56     The patient was instructed in a home exercise program to address the above findings/deficits. Pt will benefit from PT interventions to address the aforementioned deficits and allow pt to return to PLOF. Pt's therapy may include: manual lymphatic drainage, compression wrapping,  decongestive exercises, and patient education on skin care, signs and symptoms of infection, and self-management upon DC.      ==================================================================================  Eval Complexity: History MEDIUM  Complexity : 1-2 comorbidities / personal factors will impact the outcome/ POC ;  Examination  HIGH Complexity : 4+ Standardized tests and measures addressing body structure, function, activity limitation and / or participation in recreation ; Presentation MEDIUM Complexity : Evolving with changing characteristics ;   Decision Making MEDIUM Complexity : FOTO score of 26-74; Overall Complexity MEDIUM  Problem List: pain affecting function, decrease ROM, edema affecting function, decrease ADL/ functional abilitiies, decrease activity tolerance and decrease flexibility/ joint mobility   Treatment Plan may include any combination of the following: Therapeutic exercise, Therapeutic activities, Neuromuscular re-education, Physical agent/modality, Gait/balance training, Manual therapy, Aquatic therapy, Patient education, Self Care training, Functional mobility training, Home safety training and Stair training  Patient / Family readiness to learn indicated by: asking questions, trying to perform skills and interest  Persons(s) to be included in education: patient (P)   Barriers to Learning/Limitations: None  Measures taken, if barriers to learning:    Patient Goal (s): Decreased pain and swelling   Patient self reported health status: good  Rehabilitation Potential: good  Goals:    Short Term Goals: To be accomplished in 6 treatments  1) Pt will be Independent with skin care routine to decrease risk of infection. 2) Pt will be Independent in don/doff/care of/wearing schedule of light compression. 3) Pt will verbalize with 100% accuracy which signs and symptoms to report immediately to physician concerning their condition. Long Term Goals: To be accomplished in 12 treatments  1) Pt will be Independent with compression management routine consisting of skin care, decongestive exercises, self-manual lymphatic stimulation techniques, strengthening and motion exercises, and don/doff/care of appropriate compression garment(s). 2) Patient will demonstrate decongestion of limb by 5% to improve functional ADL's  3) Pt will increase strength of affected limb by 1/2MMT  in order to improve functional ADL's    Frequency / Duration:   Patient to be seen  1  times per week for 8-12  treatments:  Patient / Caregiver education and instruction: exercises  G-Codes (GP): NA  Therapist Signature: Elias Cummins PT, DPT   Date: 6/17/2294   Certification Period: NA Time: 8:36 AM   ==================================================================================  I certify that the above Physical Therapy Services are being furnished while the patient is under my care. I agree with the treatment plan and certify that this therapy is necessary. Physician Signature:        Date:       Time:     Please sign and return to [facility], [facility]. or you may fax the signed copy to [phone]. Thank you.

## 2020-05-28 NOTE — PROGRESS NOTES
PHYSICAL THERAPY - INITIAL EVALUATION & TREATMENT NOTE    Patient Name: Samy Bhat        Date: 2020  : 1960   yes Patient  Verified  Visit #:   1     Insurance: Payor: BLUE CROSS / Plan: [address] [place] [address] / Product Type: PPO /      In time: 1030 Out time: 1120   Total Treatment Time: 50     Medicare/[facility] Time Tracking (below)   Total Timed Codes (min):  30 1:1 Treatment Time:  30     TREATMENT AREA =  Lymphedema of left lower extremity [I89.0]    SUBJECTIVE  Pain Level (on 0 to 10 scale):  0  / 10   Medication Changes/New allergies or changes in medical history, any new surgeries or procedures?    no  If yes, update Summary List   Subjective Functional Status/Changes:  []  No changes reported     SEE POC         OBJECTIVE    20 min Evaluation- SEE POC     30 min Manual Therapy: MLD to the L LE in supine   Rationale: decreased edema to improve the patients ability to perform functional ADL's    Billed With/As:   [] TE   [x] TA   [] Neuro   [] Self Care Patient Education: [x] Review HEP     [x] other: PATIENT GIVEN LYMPHEDEMA INFORMATION PACKET     Other Objective/Functional Measures:    SEE POC     Post Treatment Pain Level (on 0 to 10) scale:   0  / 10     ASSESSMENT  Assessment/Changes in Function:        Evaluation Code Complexity: History MEDIUM  Complexity : 1-2 comorbidities / personal factors will impact the outcome/ POC ; Examination HIGH Complexity : 4+ Standardized tests and measures addressing body structure, function, activity limitation and / or participation in recreation ; Presentation MEDIUM Complexity : Evolving with changing characteristics ; Decision Making MEDIUM Complexity : FOTO score of 26-74;  Complexity MEDIUM    Justification for Eval Code Complexity:  Patient History : Hx of cancer, HBP  Examination SEE ABOVE EXAM  Clinical Presentation: evolving edema  Clinical Decision Making : FOTO 73         []  See Progress Note/Recertification   Patient will continue to benefit from skilled PT services to modify and progress therapeutic interventions, address functional mobility deficits, address ROM deficits, address strength deficits, analyze and address soft tissue restrictions and address lymphedema to attain remaining goals. Progress toward goals / Updated goals:    SEE POC     PLAN  []  Upgrade activities as tolerated yes Continue plan of care   []  Discharge due to :    [x]  Other: Pt will be seen 1 times per week for 8-12 sessions.       Therapist: Pamela Chaudhary PT, DPT    Date: 5/28/2020 Time: 8:37 AM     Future Appointments   Date Time Provider Lacey Toussaint   5/28/2020 11:00 AM Augusta Yip, PT [facility]

## 2020-06-03 ENCOUNTER — APPOINTMENT (OUTPATIENT)
Dept: PHYSICAL THERAPY | Age: 60
End: 2020-06-03

## 2020-06-03 ENCOUNTER — HOSPITAL ENCOUNTER (OUTPATIENT)
Dept: PHYSICAL THERAPY | Age: 60
Discharge: HOME | End: 2020-06-03
Payer: COMMERCIAL

## 2020-06-03 PROCEDURE — 97140 MANUAL THERAPY 1/> REGIONS: CPT

## 2020-06-03 NOTE — PROGRESS NOTES
PHYSICAL THERAPY - DAILY TREATMENT NOTE    Patient Name: Simpson Canavan        Date: 6/3/2020  : 1960   yes Patient  Verified  Visit #:   2     Insurance: Payor: BLUE CROSS / Plan: [address] [place] [address] / Product Type: PPO /      In time: 900 Out time: 955   Total Treatment Time: 55     Medicare/[facility] Time Tracking (below)   Total Timed Codes (min):  55 1:1 Treatment Time:  55     TREATMENT AREA =  Lymphedema of left lower extremity [I89.0]    SUBJECTIVE  Pain Level (on 0 to 10 scale):  0  / 10   Medication Changes/New allergies or changes in medical history, any new surgeries or procedures?    no  If yes, update Summary List   Subjective Functional Status/Changes:  []  No changes reported     Pt reports sustaining a tarik horse in his L gastroc Dario night.           OBJECTIVE    55 min Manual Therapy: MLD: L LE   Rationale:      decrease edema  to improve patient's ability to perform functional ADL's      ND min Therapeutic Exercise: Decongestive exercises per flow sheet   Rationale:    Decreased Lymphedema  to improve the patients ability to perform functional ADL's      Billed With/As:   [] TE   [] TA   [x] Manual   [] Self Care Patient Education: [x] Review HEP    [] Progressed/Changed HEP based on:   [] positioning   [] body mechanics   [] transfers   [] heat/ice application    [] other: Discussed with and educated patient on importance of decongestive exercises during manual therapy     Other Objective/Functional Measures:    Initiated MLD to the L LE in long sitting    Mild dry scaly patchy skin on the medial thigh- decreased following manual today         Post Treatment Pain Level (on 0 to 10) scale:   0  / 10     ASSESSMENT  Assessment/Changes in Function:     No adverse reaction to therex or MLD today       []  See Progress Note/Recertification   Patient will continue to benefit from skilled PT services to modify and progress therapeutic interventions, address functional mobility deficits, address ROM deficits, address strength deficits, analyze and address soft tissue restrictions and address Lymphedema to attain remaining goals.    Progress toward goals / Updated goals:    First visit since IE     PLAN  []  Upgrade activities as tolerated yes Continue plan of care   []  Discharge due to :    []  Other:      Therapist: Rina Shaw PT, DPT    Date: 6/3/2020 Time: 8:38 AM     Future Appointments   Date Time Provider Lacey Toussaint   6/3/2020  9:00 AM Bryon Yip, PT [facility] [address]   6/9/2020 10:00 AM Bryon Yip, PT [facility] [address]   6/17/2020  9:00 AM Bryon Yip, PT [facility] [address]

## 2020-06-09 ENCOUNTER — HOSPITAL ENCOUNTER (OUTPATIENT)
Dept: PHYSICAL THERAPY | Age: 60
Discharge: HOME | End: 2020-06-09
Payer: COMMERCIAL

## 2020-06-09 ENCOUNTER — APPOINTMENT (OUTPATIENT)
Dept: PHYSICAL THERAPY | Age: 60
End: 2020-06-09

## 2020-06-09 PROCEDURE — 97140 MANUAL THERAPY 1/> REGIONS: CPT

## 2020-06-09 NOTE — PROGRESS NOTES
PHYSICAL THERAPY - DAILY TREATMENT NOTE    Patient Name: Lucía Dial        Date: 2020  : 1960   yes Patient  Verified  Visit #:   3     Insurance: Payor: Ana Overall / Plan: [address] [place] [address] / Product Type: PPO /      In time: 1000 Out time: 1055   Total Treatment Time: 55     Medicare/[facility] Time Tracking (below)   Total Timed Codes (min):  55 1:1 Treatment Time: 55     TREATMENT AREA =  Lymphedema of left lower extremity [I89.0]    SUBJECTIVE  Pain Level (on 0 to 10 scale):  0  / 10   Medication Changes/New allergies or changes in medical history, any new surgeries or procedures?    no  If yes, update Summary List   Subjective Functional Status/Changes:  []  No changes reported     Pt reports wearing his sleeve while playing tennis last week and noted less pain and swelling in his leg         OBJECTIVE    55 min Manual Therapy: MLD: L LE in supine   Rationale:      decrease edema  to improve patient's ability to perform functional ADL's      Billed With/As:   [] TE   [] TA   [x] Manual   [] Self Care Patient Education: [x] Review HEP    [] Progressed/Changed HEP based on:   [] positioning   [] body mechanics   [] transfers   [] heat/ice application    [] other: Discussed with and educated patient on importance of decongestive exercises during manual therapy     Other Objective/Functional Measures:    Decreased fibrotic tissue on the medial thigh today compared to last week         Post Treatment Pain Level (on 0 to 10) scale:   0  / 10     ASSESSMENT  Assessment/Changes in Function:     Pt continues to be educated on importance of compression thigh high stocking. Educated patient on compression shorts for use while playing tennis.         []  See Progress Note/Recertification   Patient will continue to benefit from skilled PT services to modify and progress therapeutic interventions, address functional mobility deficits, address ROM deficits, address strength deficits, analyze and address soft tissue restrictions and address Lymphedema to attain remaining goals. Progress toward goals / Updated goals:    No change towards goals at this time.      PLAN  []  Upgrade activities as tolerated yes Continue plan of care   []  Discharge due to :    []  Other:      Therapist: Tracey Moe PT, DPT    Date: 6/9/2020 Time: 8:38 AM     Future Appointments   Date Time Provider Lacey Toussaint   6/9/2020 10:00 AM Julienne Yip PT [facility] [address]   6/17/2020  9:00 AM Julienne iYp PT [facility] [address]

## 2020-06-17 ENCOUNTER — HOSPITAL ENCOUNTER (OUTPATIENT)
Dept: PHYSICAL THERAPY | Age: 60
Discharge: HOME | End: 2020-06-17
Payer: COMMERCIAL

## 2020-06-17 ENCOUNTER — APPOINTMENT (OUTPATIENT)
Dept: PHYSICAL THERAPY | Age: 60
End: 2020-06-17

## 2020-06-17 PROCEDURE — 97140 MANUAL THERAPY 1/> REGIONS: CPT

## 2020-06-17 NOTE — PROGRESS NOTES
PHYSICAL THERAPY - DAILY TREATMENT NOTE    Patient Name: Idris Ang        Date: 2020  : 1960   yes Patient  Verified  Visit #:   4     Insurance: Payor: Lynette Miller / Plan: [address] [place] [address] / Product Type: PPO /      In time: 850 Out time: 950   Total Treatment Time: 60     Medicare/[facility] Time Tracking (below)   Total Timed Codes (min):  60 1:1 Treatment Time: 60     TREATMENT AREA =  Lymphedema of left lower extremity [I89.0]    SUBJECTIVE  Pain Level (on 0 to 10 scale):  0  / 10   Medication Changes/New allergies or changes in medical history, any new surgeries or procedures?    no  If yes, update Summary List   Subjective Functional Status/Changes:  []  No changes reported     Pt reports he has been better with wearing the stocking at home throughout the day. OBJECTIVE    60 min Manual Therapy: MLD: L LE in supine   Rationale:      decrease edema  to improve patient's ability to perform functional ADL's      Billed With/As:   [] TE   [] TA   [x] Manual   [] Self Care Patient Education: [x] Review HEP    [] Progressed/Changed HEP based on:   [] positioning   [] body mechanics   [] transfers   [] heat/ice application    [] other: Discussed with and educated patient on importance of decongestive exercises during manual therapy     Other Objective/Functional Measures: Moderate decrease in fibrotic tissue of the medial thigh today- likely due to consistency with wearing the compression sock at home. Post Treatment Pain Level (on 0 to 10) scale:   0  / 10     ASSESSMENT  Assessment/Changes in Function:     Educated patient to continue with stocking wear. Will continue to benefit from MLD to address swelling in the thigh and L hip.        []  See Progress Note/Recertification   Patient will continue to benefit from skilled PT services to modify and progress therapeutic interventions, address functional mobility deficits, address ROM deficits, address strength deficits, analyze and address soft tissue restrictions and address Lymphedema to attain remaining goals. Progress toward goals / Updated goals: (updated as of 6/17/2020)    Goals:    Short Term Goals: To be accomplished in 6 treatments  1) Pt will be Independent with skin care routine to decrease risk of infection- goal met  2) Pt will be Independent in don/doff/care of/wearing schedule of light compression- goal met with intermittent wear  3) Pt will verbalize with 100% accuracy which signs and symptoms to report immediately to physician concerning their condition- goal met        Long Term Goals: To be accomplished in 12 treatments  1) Pt will be Independent with compression management routine consisting of skin care, decongestive exercises, self-manual lymphatic stimulation techniques, strengthening and motion exercises, and don/doff/care of appropriate compression garment(s).   2) Patient will demonstrate decongestion of limb by 5% to improve functional ADL's  3) Pt will increase strength of affected limb by 1/2MMT  in order to improve functional ADL's     PLAN  []  Upgrade activities as tolerated yes Continue plan of care   []  Discharge due to :    []  Other:      Therapist: Julieta Rodriguez PT, DPT    Date: 6/17/2020 Time: 8:38 AM     Future Appointments   Date Time Provider Lacey Toussaint   6/17/2020  9:00 AM Serafin Yip, [facility]   6/23/2020  9:00 AM Serafin Yip, PT [facility]   6/30/2020  9:00 AM Serafin Yip, PT [facility] [address]

## 2020-06-23 ENCOUNTER — HOSPITAL ENCOUNTER (OUTPATIENT)
Dept: PHYSICAL THERAPY | Age: 60
Discharge: HOME | End: 2020-06-23
Payer: COMMERCIAL

## 2020-06-23 PROCEDURE — 97140 MANUAL THERAPY 1/> REGIONS: CPT

## 2020-06-23 NOTE — PROGRESS NOTES
PHYSICAL THERAPY - DAILY TREATMENT NOTE    Patient Name: Sarthak Mijares        Date: 2020  : 1960   yes Patient  Verified  Visit #:   5   of     Insurance: Payor: Lashay Sessions / Plan: [address] [place] [address] / Product Type: PPO /      In time: 900 Out time: 945   Total Treatment Time: 45     Medicare/[facility] Time Tracking (below)   Total Timed Codes (min):  45 1:1 Treatment Time: 45     TREATMENT AREA =  Lymphedema of left lower extremity [I89.0]    SUBJECTIVE  Pain Level (on 0 to 10 scale):  0  / 10   Medication Changes/New allergies or changes in medical history, any new surgeries or procedures?    no  If yes, update Summary List   Subjective Functional Status/Changes:  []  No changes reported     Pt reports he has been wearing his old compression sock daily for the past week. OBJECTIVE    45 min Manual Therapy: MLD: L LE in supine   Rationale:      decrease edema  to improve patient's ability to perform functional ADL's      Billed With/As:   [] TE   [] TA   [x] Manual   [] Self Care Patient Education: [x] Review HEP    [] Progressed/Changed HEP based on:   [] positioning   [] body mechanics   [] transfers   [] heat/ice application    [] other: Discussed with and educated patient on importance of decongestive exercises during manual therapy     Other Objective/Functional Measures:    IE 2020 Left Right   MTP 23 23   Navicular 25 25   Ankle 24 24   Calf 44 40   Knee 48 42   Thigh  62 56       2020 Left   MTP 23   Navicular 25   Ankle 24   Calf 44   Knee 48   Thigh  60            Post Treatment Pain Level (on 0 to 10) scale:   0  / 10     ASSESSMENT  Assessment/Changes in Function:     Pt continues to report feeling \"good\" in regards to the leg following therapy. Continues to have some heaviness and pain with sitting in the care for more then an hour. Educated patient on changing compression sock to new one to improve compression.        []  See Progress Note/Recertification Patient will continue to benefit from skilled PT services to modify and progress therapeutic interventions, address functional mobility deficits, address ROM deficits, address strength deficits, analyze and address soft tissue restrictions and address Lymphedema to attain remaining goals. Progress toward goals / Updated goals: (updated as of 6/23/2020)    Goals:    Short Term Goals: To be accomplished in 6 treatments  1) Pt will be Independent with skin care routine to decrease risk of infection- goal met  2) Pt will be Independent in don/doff/care of/wearing schedule of light compression- goal met with intermittent wear  3) Pt will verbalize with 100% accuracy which signs and symptoms to report immediately to physician concerning their condition- goal met        Long Term Goals: To be accomplished in 12 treatments  1) Pt will be Independent with compression management routine consisting of skin care, decongestive exercises, self-manual lymphatic stimulation techniques, strengthening and motion exercises, and don/doff/care of appropriate compression garment(s).   2) Patient will demonstrate decongestion of limb by 5% to improve functional ADL's  3) Pt will increase strength of affected limb by 1/2MMT  in order to improve functional ADL's     PLAN  []  Upgrade activities as tolerated yes Continue plan of care   []  Discharge due to :    []  Other:      Therapist: Elias Cummins PT, DPT    Date: 6/23/2020 Time: 8:38 AM     Future Appointments   Date Time Provider Lacey Toussaint   6/23/2020  9:00 AM Jelena Yip, [facility]   6/30/2020  9:00 AM Jelena Yip, [facility]

## 2020-06-30 ENCOUNTER — HOSPITAL ENCOUNTER (OUTPATIENT)
Dept: PHYSICAL THERAPY | Age: 60
Discharge: HOME | End: 2020-06-30
Payer: COMMERCIAL

## 2020-06-30 PROCEDURE — 97140 MANUAL THERAPY 1/> REGIONS: CPT

## 2020-06-30 NOTE — PROGRESS NOTES
Yesika Walsh [address]  [facility] THERAPY  [address] [facility], [facility] [address],[facility], [facility] - Phone: [phone]  Fax: [phone]  PROGRESS NOTE  Patient Name: Aries Scott : 1960   Treatment/Medical Diagnosis: Lymphedema of left lower extremity [I89.0]   Referral Source: Hoa Wells MD     Date of Initial Visit: 2020 Attended Visits: 6 Missed Visits: CX 0  NS 0     SUMMARY OF TREATMENT  Pt was seen on 2020 for an initial evaluation for L LE Lymphedema. Pt has been seen for 6 visits and therapy has included: manual lymphatic drainage, and patient education on skin care, s/s infection, decongestive exercises, and HEP. CURRENT STATUS  Pt has been seen for 6 visits since IE on 2020. Pt's therapy has included: manual lymphatic drainage, as well as patient education on self-management including: decongestive exercises, self-massage, positioning including elevation, appropriate wear of compression, as well as proper skin care and signs/symptoms of infection. Measurements are listed above. Pt reports max pain 3/10 at this time, with average pain 0/10. Pt reports good compliance with HEP at this time which includes decongestive exercises, self-massage, and positioning/elevation. Pt continues to intermittently wear compression, and notes pain in the leg at the end of the day and with longer periods of sitting. GOALS:  Goals:    Short Term Goals: To be accomplished in 6 treatments  1) Pt will be Independent with skin care routine to decrease risk of infection- goal met  2) Pt will be Independent in don/doff/care of/wearing schedule of light compression- goal met with intermittent wear  3) Pt will verbalize with 100% accuracy which signs and symptoms to report immediately to physician concerning their condition- goal met        Long Term Goals:  To be accomplished in 12 treatments  1) Pt will be Independent with compression management routine consisting of skin care, decongestive exercises, self-manual lymphatic stimulation techniques, strengthening and motion exercises, and don/doff/care of appropriate compression garment(s). 2) Patient will demonstrate decongestion of limb by 5% to improve functional ADL's  3) Pt will increase strength of affected limb by 1/2MMT  in order to improve functional ADL's      MEASUREMENTS:    IE 5/28/2020 Left Right   MTP 23 23   Navicular 25 25   Ankle 24 24   Calf 44 40   Knee 48 42   Thigh  62 56       6/23/2020 Left   MTP 23   Navicular 25   Ankle 24   Calf 44   Knee 48   Thigh  60     2cm reduction in L thigh    RECOMMENDATIONS  Continue skilled PT 1- 2x/week for 6-8 session to progress patient to discharge and self management of symptoms through decongestive exercises, self massage, and compression wearing. If you have any questions/comments please contact us directly at [phone]. Thank you for allowing us to assist in the care of your patient. Therapist Signature: Mt Br[place]ila PT, DPT Date: 6/30/2020   Reporting Period: NA Time: 7:19 AM   NOTE TO PHYSICIAN:  PLEASE COMPLETE THE ORDERS BELOW AND FAX TO   [facility]: [phone]  If you are unable to process this request in 24 hours please contact our office: [phone]    ___ I have read the above report and request that my patient continue as recommended.   ___ I have read the above report and request that my patient continue therapy with the following changes/special instructions:_________________________________________________________   ___ I have read the above report and request that my patient be discharged from therapy.      Physician Signature:        Date:       Time:

## 2020-06-30 NOTE — PROGRESS NOTES
PHYSICAL THERAPY - DAILY TREATMENT NOTE    Patient Name: Jaime Adame        Date: 2020  : 1960   yes Patient  Verified  Visit #:   6     Insurance: Payor: Mariza Marvin / Plan: [address] [place] [address] / Product Type: PPO /      In time: 900 Out time: 945   Total Treatment Time: 45     Medicare/[facility] Time Tracking (below)   Total Timed Codes (min):  45 1:1 Treatment Time: 45     TREATMENT AREA =  Lymphedema of left lower extremity [I89.0]    SUBJECTIVE  Pain Level (on 0 to 10 scale):  0  / 10   Medication Changes/New allergies or changes in medical history, any new surgeries or procedures?    no  If yes, update Summary List   Subjective Functional Status/Changes:  []  No changes reported     SEE PN         OBJECTIVE    45 min Manual Therapy: MLD: L LE in supine   Rationale:      decrease edema  to improve patient's ability to perform functional ADL's      Billed With/As:   [] TE   [] TA   [x] Manual   [] Self Care Patient Education: [x] Review HEP    [] Progressed/Changed HEP based on:   [] positioning   [] body mechanics   [] transfers   [] heat/ice application    [] other: Discussed with and educated patient on importance of decongestive exercises during manual therapy     Other Objective/Functional Measures:    SEE PN       Post Treatment Pain Level (on 0 to 10) scale:   0  / 10     ASSESSMENT  Assessment/Changes in Function:     SEE PN       []  See Progress Note/Recertification   Patient will continue to benefit from skilled PT services to modify and progress therapeutic interventions, address functional mobility deficits, address ROM deficits, address strength deficits, analyze and address soft tissue restrictions and address Lymphedema to attain remaining goals. Progress toward goals / Updated goals: (updated as of 2020)    Goals:    Short Term Goals:  To be accomplished in 6 treatments  1) Pt will be Independent with skin care routine to decrease risk of infection- goal met  2) Pt will be Independent in don/doff/care of/wearing schedule of light compression- goal met with intermittent wear  3) Pt will verbalize with 100% accuracy which signs and symptoms to report immediately to physician concerning their condition- goal met        Long Term Goals: To be accomplished in 12 treatments  1) Pt will be Independent with compression management routine consisting of skin care, decongestive exercises, self-manual lymphatic stimulation techniques, strengthening and motion exercises, and don/doff/care of appropriate compression garment(s).   2) Patient will demonstrate decongestion of limb by 5% to improve functional ADL's  3) Pt will increase strength of affected limb by 1/2MMT  in order to improve functional ADL's     PLAN  []  Upgrade activities as tolerated yes Continue plan of care   []  Discharge due to :    []  Other:      Therapist: Jaclyn Longoria, PT, DPT    Date: 6/30/2020 Time: 8:38 AM     Future Appointments   Date Time Provider Lacey Toussaint   6/30/2020  9:00 AM Jostin Yip, PT [facility] [address]

## 2020-07-14 ENCOUNTER — HOSPITAL ENCOUNTER (OUTPATIENT)
Dept: PHYSICAL THERAPY | Age: 60
Discharge: HOME | End: 2020-07-14
Payer: COMMERCIAL

## 2020-07-14 PROCEDURE — 97140 MANUAL THERAPY 1/> REGIONS: CPT

## 2020-07-14 NOTE — PROGRESS NOTES
PHYSICAL THERAPY - DAILY TREATMENT NOTE    Patient Name: Pastor Calero        Date: 2020  : 1960   yes Patient  Verified  Visit #:     Insurance: Payor: Josefa Mcrae / Plan: [address] [place] [address] / Product Type: PPO /      In time: 850 Out time: 940   Total Treatment Time: 50     Medicare/[employer] Time Tracking (below)   Total Timed Codes (min):  50 1:1 Treatment Time: 50     TREATMENT AREA =  Lymphedema of left lower extremity [I89.0]    SUBJECTIVE  Pain Level (on 0 to 10 scale):  0  / 10   Medication Changes/New allergies or changes in medical history, any new surgeries or procedures?    no  If yes, update Summary List   Subjective Functional Status/Changes:  []  No changes reported     Pt reported good compliance with HEP as well as stocking wearing over the last 2 weeks. OBJECTIVE    50 min Manual Therapy: MLD: L LE in supine   Rationale:      decrease edema  to improve patient's ability to perform functional ADL's      Billed With/As:   [] TE   [] TA   [x] Manual   [] Self Care Patient Education: [x] Review HEP    [] Progressed/Changed HEP based on:   [] positioning   [] body mechanics   [] transfers   [] heat/ice application    [] other: Discussed with and educated patient on importance of decongestive exercises during manual therapy     Other Objective/Functional Measures:    No noted pitting or fibrotic tissue on the inner thigh today. Mild dry texture of the inner thigh, advised to apply lotion daily to prevent cracking. Good results to the LE with 2 weeks break. Will continue at 2 week intervals to assess weaning to HEP only. Post Treatment Pain Level (on 0 to 10) scale:   0  / 10     ASSESSMENT  Assessment/Changes in Function:     Will continue to progress therex within current POC as patient is able.          []  See Progress Note/Recertification   Patient will continue to benefit from skilled PT services to modify and progress therapeutic interventions, address functional mobility deficits, address ROM deficits, address strength deficits, analyze and address soft tissue restrictions and address Lymphedema to attain remaining goals.    Progress toward goals / Updated goals: (updated as of 6/23/2020)    PN last visit     PLAN  []  Upgrade activities as tolerated yes Continue plan of care   []  Discharge due to :    []  Other:      Therapist: Jed Levin, PT, DPT    Date: 7/14/2020 Time: 8:38 AM     Future Appointments   Date Time Provider Lacey Toussaint   7/14/2020  9:00 AM Lashon Yip, PT [address]

## 2020-07-29 ENCOUNTER — APPOINTMENT (OUTPATIENT)
Dept: PHYSICAL THERAPY | Age: 60
End: 2020-07-29
Payer: COMMERCIAL

## 2020-12-16 NOTE — PROGRESS NOTES
[facility] PHYSICAL THERAPY   [facility], [place],Yakelin Herrera, 70 [facility] - Phone: [phone]  Fax: [phone] FOR PHYSICAL THERAPY          Patient Name: Ina Homans : 1960   Treatment/Medical Diagnosis: Lymphedema of left lower extremity [I89.0]   Onset Date:     Referral Source: Padmini Garcia MD [facility]): 2020   Prior Hospitalization: See Medical History Provider #: [id number]   Prior Level of Function: I with ADL's, no LE pain or swelling prior to surgery   Comorbidities:  HBP, Hx of cancer      Medications: Verified on Patient Summary List   Visits from [facility]: 7 Missed Visits: 1     Key Functional Changes/Progress:  Pt was seen for 7 visit since his IE on 2020. Pt continued to be compliant with compression wearing, self MLD, and LE exercises. Pt is able to self manage symptoms at this time and will be DC from therapy. GOALS:  Goals:    Short Term Goals: To be accomplished in 6 treatments  1) Pt will be Independent with skin care routine to decrease risk of infection- goal met  2) Pt will be Independent in don/doff/care of/wearing schedule of light compression- goal met   3) Pt will verbalize with 100% accuracy which signs and symptoms to report immediately to physician concerning their condition- goal met        Long Term Goals: To be accomplished in 12 treatments  1) Pt will be Independent with compression management routine consisting of skin care, decongestive exercises, self-manual lymphatic stimulation techniques, strengthening and motion exercises, and don/doff/care of appropriate compression garment(s). - goal met  2) Patient will demonstrate decongestion of limb by 5% to improve functional ADL's- goal not met but improved  3) Pt will increase strength of affected limb by 1/2MMT  in order to improve functional ADL's- goal met       MEASUREMENTS:     IE 2020 Left Right   MTP 23 23   Navicular 25 25   Ankle 24 24   Calf 44 40   Knee 48 42   Thigh  62 56         6/23/2020 Left   MTP 23   Navicular 25   Ankle 24   Calf 44   Knee 48   Thigh  60      2cm reduction in L thigh         Assessments/Recommendations: Discontinue therapy. Progressing towards or have reached established goals. If you have any questions/comments please contact us directly at [phone]. Thank you for allowing us to assist in the care of your patient.     Therapist Signature: Bibi Naranjo PT, DPT   Date: 7/14/2020   Reporting Period: NA Time: 9:06 AM

## 2020-12-18 ENCOUNTER — HOSPITAL ENCOUNTER (OUTPATIENT)
Dept: PHYSICAL THERAPY | Age: 60
Discharge: HOME | End: 2020-12-18
Payer: COMMERCIAL

## 2020-12-18 PROCEDURE — 97162 PT EVAL MOD COMPLEX 30 MIN: CPT

## 2020-12-18 PROCEDURE — 97140 MANUAL THERAPY 1/> REGIONS: CPT

## 2020-12-18 NOTE — PROGRESS NOTES
PHYSICAL THERAPY - INITIAL EVALUATION & TREATMENT NOTE    Patient Name: Rosalia Urbina        Date: 2020  : 1960   yes Patient  Verified  Visit #:   1   of   4  Insurance: Payor: Pk Patel / Plan: [address] [place] [address] / Product Type: PPO /      In time: 900 Out time: 950   Total Treatment Time: 50     Medicare/[employer] Time Tracking (below)   Total Timed Codes (min):  30 1:1 Treatment Time:  30     TREATMENT AREA =  Lymphedema of lower extremity [I89.0]    SUBJECTIVE  Pain Level (on 0 to 10 scale):  0  / 10   Medication Changes/New allergies or changes in medical history, any new surgeries or procedures?    no  If yes, update Summary List   Subjective Functional Status/Changes:  []  No changes reported     SEE POC         OBJECTIVE    20 min Evaluation- SEE POC     30 min Manual Therapy: MLD to the L LE in long sitting   Rationale: decreased edema to improve the patients ability to perform functional ADL's  The manual therapy interventions were performed at a separate and distinct time from the therapeutic activities interventions. Billed With/As:   [] TE   [x] TA   [] Neuro   [] Self Care Patient Education: [x] Review HEP     [x] other: PATIENT GIVEN LYMPHEDEMA INFORMATION PACKET     Other Objective/Functional Measures:    SEE POC     Post Treatment Pain Level (on 0 to 10) scale:   0  / 10     ASSESSMENT  Assessment/Changes in Function:        Evaluation Code Complexity: History MEDIUM  Complexity : 1-2 comorbidities / personal factors will impact the outcome/ POC ; Examination HIGH Complexity : 4+ Standardized tests and measures addressing body structure, function, activity limitation and / or participation in recreation ; Presentation MEDIUM Complexity : Evolving with changing characteristics ; Decision Making MEDIUM Complexity : FOTO score of 26-74;  Complexity MEDIUM    Justification for Eval Code Complexity:  Patient History : PMH  Examination SEE ABOVE EXAM  Clinical Presentation: evolving edema  Clinical Decision Making : FOTO 74         []  See Progress Note/Recertification   Patient will continue to benefit from skilled PT services to modify and progress therapeutic interventions, address functional mobility deficits, address ROM deficits, address strength deficits, analyze and address soft tissue restrictions and address lymphedema to attain remaining goals. Progress toward goals / Updated goals:    SEE POC     PLAN  []  Upgrade activities as tolerated yes Continue plan of care   []  Discharge due to :    [x]  Other: Pt will be seen 1-2 times per week for 4 sessions.       Therapist: Mani Montaño, PT, DPT    Date: 12/18/2020 Time: 7:31 AM     Future Appointments   Date Time Provider Lacey Toussaint   12/18/2020  9:00 AM Pooja Yip, PT [place] [address]

## 2020-12-18 NOTE — PROGRESS NOTES
[address], [address], 70 [facility] - Phone: [phone]  Fax: [phone] OF CARE / [address] [facility]  Patient Name: Abigail Babb : 1960   Medical   Diagnosis: Lymphedema of lower extremity [I89.0] Treatment Diagnosis: Lymphedema of lower extremity [I89.0]   Onset Date:      Referral Source: Fabian Mathias MD [place] [facility]): 2020   Prior Hospitalization: See medical history Provider #: [id number]   Prior Level of Function: I with ADL's, no LE pain or swelling prior to surgery   Comorbidities: HBP, Hx of cancer   Medications: Verified on Patient Summary List   The Plan of Care and following information is based on the information from the initial evaluation.   ==================================================================================  Assessment / key information:  Abigail Babb is a 56 y.o.  yo male with Dx: Lymphedema of lower extremity [I89.0]. Pt has been seen in the clinic previously for 2 episodes of care, with the last being 2020. Pt reports current symptoms include: pain in the medial thigh as well as swelling. Pt reports only medical change since last episode of care is change to new oral chemo medication which is listed in his chart. Pt reports pain ranges 0-5/10 located in the medial thigh and occasional L knee. Functional limitations include: difficulty with recreational activities and sitting longer periods of time. Pt reports no red flags or contraindications to treatment at this time. FOTO score = 74 (an established functional score where 100 = no disability)  Current Exam findings: Gait WNL, AROM: grossly WNL in all directions, Gross Strength: grossly WNL in all directions, Skin Integrity: moderate dry scaly texture with minor pitting along the medial L thigh, Sensation: intact to light touch.   Circumferential Measurements are listed below:   Left   MTP 24.5   Navicular 24   Ankle 24   Calf 43   Knee 41   Thigh  59.5   Total in cm 0     The patient was instructed in a home exercise program to address the above findings/deficits. Pt will benefit from PT interventions to address the aforementioned deficits and allow pt to return to PLOF. Pt's therapy may include: manual lymphatic drainage, compression wrapping,  decongestive exercises, and patient education on skin care, signs and symptoms of infection, and self-management upon DC.  ==================================================================================  Eval Complexity: History MEDIUM  Complexity : 1-2 comorbidities / personal factors will impact the outcome/ POC ;  Examination  HIGH Complexity : 4+ Standardized tests and measures addressing body structure, function, activity limitation and / or participation in recreation ; Presentation MEDIUM Complexity : Evolving with changing characteristics ; Decision Making MEDIUM Complexity : FOTO score of 26-74; Overall Complexity MEDIUM  Problem List: pain affecting function, edema affecting function, impaired gait/ balance and decrease ADL/ functional abilitiies   Treatment Plan may include any combination of the following: Therapeutic exercise, Therapeutic activities, Neuromuscular re-education, Physical agent/modality, Gait/balance training, Manual therapy, Patient education and Self Care training  Patient / Family readiness to learn indicated by: asking questions, trying to perform skills and interest  Persons(s) to be included in education: patient (P)   Barriers to Learning/Limitations: None  Measures taken, if barriers to learning:    Patient Goal (s): Get rid of swelling   Patient self reported health status: good  Rehabilitation Potential: good  Goals:    Short Term Goals:  To be accomplished in 6 treatments  1) Pt will be I with LE decongestive exercises in order to assist with edema prevention and improve functional ADL's    Long Term Goals: To be accomplished in 12 treatments  1) Pt will be Independent with compression management routine consisting of skin care, decongestive exercises, self-manual lymphatic stimulation techniques, strengthening and motion exercises, and don/doff/care of appropriate compression garment(s). 2) Patient will demonstrate decongestion of limb by 3% to improve functional ADL's      Frequency / Duration:   Patient to be seen 1-2  times per week for 4 treatments:  Patient / Caregiver education and instruction: exercises  G-Codes (GP): YORDAN  Therapist Signature: Ulices Sweet PT, DPT   Date: 10/00/4378   Certification Period: NA Time: 7:26 AM   ==================================================================================  I certify that the above Physical Therapy Services are being furnished while the patient is under my care. I agree with the treatment plan and certify that this therapy is necessary. Physician Signature:        Date:       Time:     Please sign and return to [facility] at [facility] [place], [facility]. or you may fax the signed copy to [phone]. Thank you.

## 2020-12-22 ENCOUNTER — HOSPITAL ENCOUNTER (OUTPATIENT)
Dept: PHYSICAL THERAPY | Age: 60
Discharge: HOME | End: 2020-12-22
Payer: COMMERCIAL

## 2020-12-22 PROCEDURE — 97140 MANUAL THERAPY 1/> REGIONS: CPT

## 2020-12-22 NOTE — PROGRESS NOTES
PHYSICAL THERAPY - DAILY TREATMENT NOTE    Patient Name: Richardson Morgan        Date: 2020  : 1960   yes Patient  Verified  Visit #:   2   of   4  Insurance: Payor: Mina Barcenas / Plan: [address] [place] [address] / Product Type: PPO /      In time: 900 Out time: 940   Total Treatment Time: 40     Medicare/[employer] Time Tracking (below)   Total Timed Codes (min):  40 1:1 Treatment Time:  40     TREATMENT AREA =  Lymphedema of lower extremity [I89.0]    SUBJECTIVE  Pain Level (on 0 to 10 scale):  0  / 10   Medication Changes/New allergies or changes in medical history, any new surgeries or procedures?    no  If yes, update Summary List   Subjective Functional Status/Changes:  []  No changes reported     Pt reports wearing his compression stocking every other day at home and while playing tennis          OBJECTIVE    40 min Manual Therapy: MLD: L LE in supine   Rationale:      decrease edema  to improve patient's ability to perform functional ADL's  The manual therapy interventions were performed at a separate and distinct time from the therapeutic activities interventions.     Billed With/As:   [] TE   [] TA   [x] Manual   [] Self Care Patient Education: [x] Review HEP    [] Progressed/Changed HEP based on:   [] positioning   [] body mechanics   [] transfers   [] heat/ice application    [] other: Discussed with and educated patient on importance of decongestive exercises during manual therapy     Other Objective/Functional Measures:    -Initiated MLD treatment today to the L LE in supine       Post Treatment Pain Level (on 0 to 10) scale:   0  / 10     ASSESSMENT  Assessment/Changes in Function:     Moderate fibrotic tissue along the medial thigh on the L     []  See Progress Note/Recertification   Patient will continue to benefit from skilled PT services to modify and progress therapeutic interventions, address functional mobility deficits, address ROM deficits, address strength deficits, analyze and address soft tissue restrictions and address Lymphedema to attain remaining goals.    Progress toward goals / Updated goals:    Progress towards STG's: first visit since IE  Progress towards LTG's: first visit since IE     PLAN  []  Upgrade activities as tolerated yes Continue plan of care   []  Discharge due to :    []  Other:      Therapist: Salas Severino PT, DPT    Date: 12/22/2020 Time: 7:16 AM     Future Appointments   Date Time Provider Lacey Toussaint   12/22/2020  9:00 AM Angelina Yip, [facility] [address]   12/29/2020 12:00 PM Angelina Yip, [facility] [address]

## 2020-12-29 ENCOUNTER — HOSPITAL ENCOUNTER (OUTPATIENT)
Dept: PHYSICAL THERAPY | Age: 60
Discharge: HOME | End: 2020-12-29
Payer: COMMERCIAL

## 2020-12-29 PROCEDURE — 97140 MANUAL THERAPY 1/> REGIONS: CPT

## 2020-12-29 NOTE — PROGRESS NOTES
PHYSICAL THERAPY - DAILY TREATMENT NOTE    Patient Name: Jenelle Damon        Date: 2020  : 1960   yes Patient  Verified  Visit #:   3   of   4  Insurance: Payor: Sureshyesisruthi Ismael / Plan: [address] [place] [address] / Product Type: PPO /     In time: 1150 Out time: 1230   Total Treatment Time: 40     Medicare/[employer] Time Tracking (below)   Total Timed Codes (min):  40 1:1 Treatment Time:  40     TREATMENT AREA =  Lymphedema of lower extremity [I89.0]    SUBJECTIVE  Pain Level (on 0 to 10 scale):  0  / 10   Medication Changes/New allergies or changes in medical history, any new surgeries or procedures?    no  If yes, update Summary List   Subjective Functional Status/Changes:  []  No changes reported     Pt reports moderate cramping that occurs in the calf at night time and around the toes following taking off the sock         OBJECTIVE    40 min Manual Therapy: MLD: L LE in supine   Rationale:      decrease edema  to improve patient's ability to perform functional ADL's  The manual therapy interventions were performed at a separate and distinct time from the therapeutic activities interventions. Billed With/As:   [] TE   [] TA   [x] Manual   [] Self Care Patient Education: [x] Review HEP    [] Progressed/Changed HEP based on:   [] positioning   [] body mechanics   [] transfers   [] heat/ice application    [] other: Discussed with and educated patient on importance of decongestive exercises during manual therapy     Other Objective/Functional Measures:    No noted edema in the lower leg, ankle, or toes  Continued difficulty with swelling in the medial thigh on the R likely due to previous surgeries, however swelling does not appear to be traveling to the lower leg at this time. Post Treatment Pain Level (on 0 to 10) scale:   0  / 10     ASSESSMENT  Assessment/Changes in Function:     Pt educated on need to wear compression sock daily to remain consistent in compression.       []  See Progress Note/Recertification   Patient will continue to benefit from skilled PT services to modify and progress therapeutic interventions, address functional mobility deficits, address ROM deficits, address strength deficits, analyze and address soft tissue restrictions and address Lymphedema to attain remaining goals.    Progress toward goals / Updated goals:         PLAN  []  Upgrade activities as tolerated yes Continue plan of care   []  Discharge due to :    []  Other:      Therapist: Stacey Austin, PT, DPT    Date: 12/29/2020 Time: 7:16 AM     Future Appointments   Date Time Provider Lacey Toussaint   12/29/2020 12:00 PM Kal Yip, PT [address]

## 2021-01-05 ENCOUNTER — HOSPITAL ENCOUNTER (OUTPATIENT)
Dept: PHYSICAL THERAPY | Age: 61
Discharge: HOME | End: 2021-01-05
Payer: COMMERCIAL

## 2021-01-05 PROCEDURE — 97140 MANUAL THERAPY 1/> REGIONS: CPT

## 2021-01-05 NOTE — PROGRESS NOTES
PHYSICAL THERAPY - DAILY TREATMENT NOTE    Patient Name: David Cassidy        Date: 2021  : 1960   yes Patient  Verified  Visit #:   4  of   -8  Insurance: Payor: BLUE CROSS / [place] / Product Type: PPO /     In time: 1000 Out time: 1040   Total Treatment Time: 40     Medicare/[employer] Time Tracking (below)   Total Timed Codes (min):  40 1:1 Treatment Time:  40     TREATMENT AREA =  Lymphedema of lower extremity [I89.0]    SUBJECTIVE  Pain Level (on 0 to 10 scale):  0  / 10   Medication Changes/New allergies or changes in medical history, any new surgeries or procedures?    no  If yes, update Summary List   Subjective Functional Status/Changes:  []  No changes reported     Pt reports wearing his compression sock 50% of the time.         OBJECTIVE    40 min Manual Therapy: MLD: L LE in supine   Rationale:      decrease edema  to improve patient's ability to perform functional ADL's  The manual therapy interventions were performed at a separate and distinct time from the therapeutic activities interventions.    Billed With/As:   [] TE   [] TA   [x] Manual   [] Self Care Patient Education: [x] Review HEP    [] Progressed/Changed HEP based on:   [] positioning   [] body mechanics   [] transfers   [] heat/ice application    [] other: Discussed with and educated patient on importance of decongestive exercises during manual therapy     Other Objective/Functional Measures:    Softening of the R thigh noted today despite pt reporting only wearing compression hose 50% of the time       Post Treatment Pain Level (on 0 to 10) scale:   0  / 10     ASSESSMENT  Assessment/Changes in Function:     Pt reports having 2 instances of \"excruciating pain\" in the lateral aspect of the lower leg yesterday while sitting for a longer period of time and talking on the phone.   Pt reports he was wearing his compression sock and noted it decreased when he took it off. Pt reports drinking water, and walking  which relieved the pain. PT advised patient to contact MD about possible side effect of new medication. Pt also advised to look into compression shorts if he feels like the compression hose are not needed for the lower part of the leg. []  See Progress Note/Recertification   Patient will continue to benefit from skilled PT services to modify and progress therapeutic interventions, address functional mobility deficits, address ROM deficits, address strength deficits, analyze and address soft tissue restrictions and address Lymphedema to attain remaining goals.    Progress toward goals / Updated goals:         PLAN  []  Upgrade activities as tolerated yes Continue plan of care   []  Discharge due to :    []  Other:      Therapist: Pamela Chaudhary PT, DPT    Date: 1/5/2021 Time: 7:16 AM     Future Appointments   Date Time Provider Lacey Toussaint   1/5/2021 10:00 AM Augusta Yip, PT [place] [address]

## 2021-03-29 NOTE — PROGRESS NOTES
[address], [facility], 70 [facility] - Phone: [phone]  Fax: [phone]  DISCHARGE SUMMARY FOR PHYSICAL THERAPY          Patient Name: Inocente Rodrigues : 1960   Treatment/Medical Diagnosis: Lymphedema of lower extremity [I89.0]   Onset Date:     Referral Source: Hollie Hector MD [facility] [place]): 2020   Prior Hospitalization: See Medical History Provider #: [id number]   Prior Level of Function:   I with ADL's, no LE pain or swelling prior to surgery   Comorbidities: HBP, Hx of cancer   Medications: Verified on Patient Summary List   Visits from [facility]: 4 Missed Visits: CX:0  NS:0   Current Status:  Pt was seen on 2021 for an IE for L LE Lymphedema. Pt was seen for 3 follow up visits and did not return to PT follow their last visit on 2021. Pt was supposed to be seen in the clinic on 1/15/2021, however PT was out of the office. Pt verbalized he would call back to reschedule appointment, however did not return to therapy. Pt has not been seen in the clinic in greater then 30 days. Pt will be DC from PT at this time. Unable to formally reassess progress towards goals or outcome measures secondary to patient not returning to therapy. G-Codes (GP): NA    Assessments/Recommendations: Other: Patient did not return to therapy in greater then 30 days and will be DC from PT at this time    If you have any questions/comments please contact us directly at [phone]. Thank you for allowing us to assist in the care of your patient.     Therapist Signature: Cole Crawford PT Date: 3/29/2021   Reporting Period: NA Time: 9:03 AM